# Patient Record
Sex: MALE | Race: BLACK OR AFRICAN AMERICAN | HISPANIC OR LATINO | Employment: FULL TIME | URBAN - METROPOLITAN AREA
[De-identification: names, ages, dates, MRNs, and addresses within clinical notes are randomized per-mention and may not be internally consistent; named-entity substitution may affect disease eponyms.]

---

## 2020-03-31 ENCOUNTER — OFFICE VISIT (OUTPATIENT)
Dept: URGENT CARE | Facility: CLINIC | Age: 50
End: 2020-03-31
Payer: COMMERCIAL

## 2020-03-31 VITALS
RESPIRATION RATE: 16 BRPM | OXYGEN SATURATION: 97 % | HEART RATE: 59 BPM | SYSTOLIC BLOOD PRESSURE: 123 MMHG | TEMPERATURE: 96.6 F | WEIGHT: 160 LBS | HEIGHT: 65 IN | DIASTOLIC BLOOD PRESSURE: 91 MMHG | BODY MASS INDEX: 26.66 KG/M2

## 2020-03-31 DIAGNOSIS — M62.838 MUSCLE SPASM: Primary | ICD-10-CM

## 2020-03-31 PROCEDURE — 99203 OFFICE O/P NEW LOW 30 MIN: CPT | Performed by: PHYSICIAN ASSISTANT

## 2020-03-31 RX ORDER — METHOCARBAMOL 750 MG/1
750 TABLET, FILM COATED ORAL EVERY 6 HOURS PRN
Qty: 30 TABLET | Refills: 0 | Status: SHIPPED | OUTPATIENT
Start: 2020-03-31

## 2020-03-31 NOTE — PROGRESS NOTES
[facility]        NAME: Cata Mendez is a 53 y o  male  : 1970    MRN: [id number]  DATE: 2020  TIME: 9:20 AM    Assessment and Plan   Muscle spasm [M62 838]  1  Muscle spasm  methocarbamol (ROBAXIN) 750 mg tablet         Patient Instructions       Follow up with PCP in 3-5 days  Proceed to  ER if symptoms worsen  Chief Complaint     Chief Complaint   Patient presents with    Back Pain     c/o right sided low back pain and states "its more like the muscle, worse when stands"   Pain for last year, denies injury  History of Present Illness       Patient presents with right flank pain for approximately the past year  Denies any injury  He states that his pain is made worse with certain movement  Denies any radiation into his lower extremities no paresthesias muscle weakness bowel or bladder incontinence  Patient also denies any urinary symptoms  He did have a history of a kidney stone which required hospitalization approximately a year ago  Review of Systems   Review of Systems   Constitutional: Negative for chills and fever  Respiratory: Negative for cough  Gastrointestinal: Negative for abdominal pain, nausea and vomiting  Genitourinary: Negative for difficulty urinating  Musculoskeletal: Positive for back pain  Skin: Negative for rash  Neurological: Negative for weakness and numbness  Hematological: Negative for adenopathy           Current Medications       Current Outpatient Medications:     methocarbamol (ROBAXIN) 750 mg tablet, Take 1 tablet (750 mg total) by mouth every 6 (six) hours as needed for muscle spasms, Disp: 30 tablet, Rfl: 0    Current Allergies     Allergies as of 2020    (No Known Allergies)            The following portions of the patient's history were reviewed and updated as appropriate: allergies, current medications, past family history, past medical history, past social history, past surgical history and problem list  History reviewed  No pertinent past medical history  History reviewed  No pertinent surgical history  History reviewed  No pertinent family history  Medications have been verified  Objective   /91 (BP Location: Left arm, Patient Position: Sitting, Cuff Size: Adult)   Pulse 59   Temp (!) 96 6 °F (35 9 °C) (Tympanic)   Resp 16   Ht 5' 5" (1 651 m)   Wt 72 6 kg (160 lb)   SpO2 97%   BMI 26 63 kg/m²        Physical Exam     Physical Exam   Constitutional: He is oriented to person, place, and time  He appears well-developed and well-nourished  HENT:   Head: Normocephalic and atraumatic  Neck: Normal range of motion  Cardiovascular: Normal rate and regular rhythm  Pulmonary/Chest: Effort normal    Musculoskeletal:   No pain on palpation of the thoracic and lumbar vertebrae  There is a muscle spasm of the right lower thoracic and upper lumbar paraspinal muscles and right flank area  Patient still has good range of motion of the spine  There is no CVA tenderness  Lower extremity muscle strength plus 5/5 bilaterally  Lower extremity reflexes +2/4 bilaterally  Neurological: He is alert and oriented to person, place, and time  Skin: Skin is warm and dry  No rash noted  Psychiatric: He has a normal mood and affect  His behavior is normal    Nursing note and vitals reviewed  Visit was interpreted by Barbara Rand

## 2020-03-31 NOTE — PATIENT INSTRUCTIONS
Espasmo muscular   LO QUE NECESITA SABER:   Un espasmo muscular es melody contracción convulsiva involuntaria de cualquier músculo o de un hanna de músculos  Un calambre muscular es un espasmo muscular muy doloroso  Los calambres musculares generalmente ocurren después del ejercicio intenso o renee el [facility]  Estos también pueden ser provocados por ciertos medicamentos, la deshidratación, bajos niveles de calcio o magnesio u otras condiciones de [facility]:   Medicamentos:  Usted podría  necesitar lo siguiente:  · AINEs (Analgésicos antiinflamatorios no esteroides)  pueden disminuir la inflamación y el dolor o la fiebre  Aba medicamento esta disponible con o sin melody receta médica  Los AINEs pueden causar sangrado estomacal o problemas renales en ciertas personas  Si usted isra un medicamento anticoagulante, siempre pregúntele a davies médico si los ASA son seguros para usted  Siempre liban la etiqueta de aba medicamento y [place] instrucciones  · [place] medicamentos netta se le haya indicado  Consulte con davies médico si usted mabel que davies medicamento no le está ayudando o si presenta efectos secundarios  Infórmele si es alérgico a algún medicamento  Mantenga melody lista actualizada de los [place], las vitaminas y los productos herbales que isra  Incluya los siguientes datos de los medicamentos: cantidad, frecuencia y motivo de administración  Traiga con usted la lista o los envases de la píldoras a hamlet citas de seguimiento  Lleve la lista de los medicamentos con usted en dee de melody emergencia  Acuda a hamlet consultas de control con davies médico según le indicaron  Usted puede necesitar otros exámenes o tratamientos  También es posible que lo refieran a un fisioterapeuta u otro especialista  Anote hamlet preguntas para que se acuerde de hacerlas renee hamlet visitas  Cuidados personales:   · El estiramiento  de davies músculo ayuda a aliviar el calambre   También puede ser de Rafat Jimenez mantener el músculo estirado hasta que el calambre desaparezca  · Aplique calor  para ayudar a disminuir el dolor y el espasmo muscular  Aplíquese calor en el área lesionada renee 20 a 30 minutos cada 2 horas renee la cantidad de [employer] indiquen  · Aplique hielo  para ayudar a disminuir la inflamación y el dolor  El hielo también puede contribuir a evitar el daño de los tejidos  Use un paquete de hielo o ponga hielo molido dentro de [employer]  Envuelva la compresa o bolsa con melody toalla y colóquela sobre davies músculo por 15 a 20 minutos cada hora o netta se lo indicaron  · Consuma más líquidos  para ayudar a prevenir espasmos musculares causados por la deshidratación  Las bebidas atléticas pueden ayudar a reemplazar los electrolitos que pierde renee el ejercicio por la sudoración  Pregunte a davies médico sobre la cantidad de líquido que necesita tacos todos los días y cuáles le recomienda  · Consuma alimentos saludables , netta frutas, verduras, granos integrales, productos lácteos bajos en grasa y proteínas bajas en grasa (carne [place], legumbres y pescado)  Si está embarazada, pregúntele a davies médico qué alimentos son ricos en magnesio y [place]  Estos pueden ayudar para [employer] calambres renee el [place]  · Dé masajes suaves sobre el músculo  para aliviar el calambre  · Respire profundo varias veces  hasta que el calambre se mejore  Acuéstese mientras respira profundo para que no sufra un mareo o desvanecimiento  Pregúntele a davies Phoebe Orlando vitaminas y minerales son adecuados para usted  · Usted presenta signos de deshidratación, netta dolor de [place] de color amarillo oscuro, ojos o boca secos o latidos cardíacos rápidos  · Usted tiene preguntas o inquietudes acerca de davies condición o cuidado  Regrese a la pat de emergencias si:   · El músculo con el calambre está caliente al tacto, inflamado o enrojecido       · Usted sufre con frecuencia y sin alivio de calambres musculares en varios músculos diferentes  · Usted presenta calambres musculares con entumecimiento, cosquilleo y sensación quemante en hamlet naila y pies  © 2017 [address] Information is for End User's use only and may not be sold, redistributed or otherwise used for commercial purposes  All illustrations and images included in CareNotes® are the copyrighted property of [employer]  or Arden Kendrick  Esta información es sólo para uso en educación  Ghosh intención no es darle un consejo médico sobre enfermedades o tratamientos  Colsulte con ghosh Heather Bough farmacéutico antes de seguir cualquier régimen médico para saber si es seguro y efectivo para usted